# Patient Record
Sex: MALE | Race: WHITE | Employment: FULL TIME | URBAN - METROPOLITAN AREA
[De-identification: names, ages, dates, MRNs, and addresses within clinical notes are randomized per-mention and may not be internally consistent; named-entity substitution may affect disease eponyms.]

---

## 2022-04-25 ENCOUNTER — APPOINTMENT (OUTPATIENT)
Dept: CT IMAGING | Age: 62
End: 2022-04-25
Attending: PHYSICIAN ASSISTANT
Payer: COMMERCIAL

## 2022-04-25 ENCOUNTER — APPOINTMENT (OUTPATIENT)
Dept: GENERAL RADIOLOGY | Age: 62
End: 2022-04-25
Attending: PHYSICIAN ASSISTANT
Payer: COMMERCIAL

## 2022-04-25 ENCOUNTER — HOSPITAL ENCOUNTER (EMERGENCY)
Age: 62
Discharge: HOME | End: 2022-04-25
Attending: STUDENT IN AN ORGANIZED HEALTH CARE EDUCATION/TRAINING PROGRAM
Payer: COMMERCIAL

## 2022-04-25 VITALS
DIASTOLIC BLOOD PRESSURE: 95 MMHG | SYSTOLIC BLOOD PRESSURE: 176 MMHG | HEART RATE: 105 BPM | RESPIRATION RATE: 20 BRPM | OXYGEN SATURATION: 99 % | TEMPERATURE: 98 F

## 2022-04-25 DIAGNOSIS — G89.29 CHRONIC MIDLINE THORACIC BACK PAIN: Primary | ICD-10-CM

## 2022-04-25 DIAGNOSIS — M54.6 CHRONIC MIDLINE THORACIC BACK PAIN: Primary | ICD-10-CM

## 2022-04-25 DIAGNOSIS — M54.2 NECK PAIN, CHRONIC: ICD-10-CM

## 2022-04-25 DIAGNOSIS — G89.29 NECK PAIN, CHRONIC: ICD-10-CM

## 2022-04-25 DIAGNOSIS — M47.819 SPONDYLOARTHRITIS: ICD-10-CM

## 2022-04-25 PROCEDURE — 72125 CT NECK SPINE W/O DYE: CPT

## 2022-04-25 PROCEDURE — 99284 EMERGENCY DEPT VISIT MOD MDM: CPT

## 2022-04-25 PROCEDURE — 72070 X-RAY EXAM THORAC SPINE 2VWS: CPT

## 2022-04-25 PROCEDURE — 74011250636 HC RX REV CODE- 250/636: Performed by: PHYSICIAN ASSISTANT

## 2022-04-25 PROCEDURE — 96372 THER/PROPH/DIAG INJ SC/IM: CPT

## 2022-04-25 RX ORDER — KETOROLAC TROMETHAMINE 10 MG/1
10 TABLET, FILM COATED ORAL
Qty: 10 TABLET | Refills: 0 | Status: SHIPPED | OUTPATIENT
Start: 2022-04-25

## 2022-04-25 RX ORDER — KETOROLAC TROMETHAMINE 15 MG/ML
15 INJECTION, SOLUTION INTRAMUSCULAR; INTRAVENOUS
Status: COMPLETED | OUTPATIENT
Start: 2022-04-25 | End: 2022-04-25

## 2022-04-25 RX ORDER — LIDOCAINE 50 MG/G
PATCH TOPICAL
Qty: 1 EACH | Refills: 0 | Status: SHIPPED | OUTPATIENT
Start: 2022-04-25

## 2022-04-25 RX ADMIN — KETOROLAC TROMETHAMINE 15 MG: 15 INJECTION, SOLUTION INTRAMUSCULAR; INTRAVENOUS at 16:47

## 2022-04-25 NOTE — ED TRIAGE NOTES
Client has hx of chronic back pain, client has regular 2.5MG Dose of Valium. Client sent by PCP for evaluation and xrays. Client ambulatory into triage. NAD. AXOX4.  Pain 5/10

## 2022-04-25 NOTE — ED PROVIDER NOTES
[address] [facility],[address]  [facility] EMERGENCY DEPT    Patient Name: Shraddha Juárez. History of Presenting Illness     63 y.o. male with a past medical history of chronic upper back pain and neck pain presents the ED requesting Valium for his back and neck. Patient reports having had this pain for at least 10 years, states that he normally sees his primary care doctor for this. He states the last time that he was seen there patient became angry and had been back in several months. Patient notes having a constant severe discomfort, worse with range of motion, consistent with his prior back pain. He denies any fever, chills, numbness or weakness, bowel or bladder function loss, saddle paresthesias, abdominal pain, other symptoms. Patient denies any other associated signs or symptoms. Patient denies any other complaints. Nursing notes regarding the HPI and triage nursing notes were reviewed. Prior medical records were reviewed. Past History     Past Medical History:  Chronic back pain     Past Surgical History:  No past surgical history on file. Family History:  No family history on file. Social History:  Social History     Tobacco Use    Smoking status: Not on file    Smokeless tobacco: Not on file   Substance Use Topics    Alcohol use: Not on file    Drug use: Not on file       Allergies:  No Known Allergies    Patient's primary care provider (as noted in EPIC):  Chris Roe MD    Review of Systems   Constitutional:  Denies malaise, fever, chills. Head:  Denies injury. Face:  Denies injury or pain. Neck: + neck pain. GI/ABD:  Denies injury, pain, distention, nausea, vomiting, diarrhea. :  Denies injury, pain, dysuria or urgency. Back:  + upper back pain. Pelvis:  Denies injury or pain. Extremity/MS:  Denies injury or pain. Neuro:  Denies headache, LOC, dizziness, neurologic symptoms/deficits/paresthesias. Skin: Denies injury, rash, itching or skin changes.   All other systems negative as reviewed. Visit Vitals  BP (!) 176/95   Pulse (!) 105   Temp 98 °F (36.7 °C)   Resp 20   SpO2 99%       PHYSICAL EXAM:  CONSTITUTIONAL:  Alert, in no apparent distress;  well developed;  well nourished. HEAD:  Normocephalic, atraumatic. EYES:  EOMI. Non-icteric sclera. Normal conjunctiva. ENTM:  Nose:  no rhinorrhea. Throat:  no erythema or exudate, mucous membranes moist.  NECK:  Supple, bony TTP to C4-C7. RESPIRATORY:  Chest clear, equal breath sounds, good air movement. Without wheezes, rhonchi or rales. CARDIOVASCULAR:  Regular rate and rhythm. No murmurs, rubs, or gallops. GI:  Normal bowel sounds, abdomen soft and non-tender. No rebound or guarding. BACK:  Superior Tspine with bony TTP without step off. No other bony TTP. No midline vertebral bony point tenderness or step-off. UPPER EXT:  Normal inspection. LOWER EXT:  No edema, no calf tenderness. Distal pulses intact. NEURO:  Moves all four extremities, and grossly normal motor exam.  SKIN:  No rashes;  Normal for age. PSYCH:  Alert and normal affect. IMPRESSION AND MEDICAL DECISION MAKING:    XR SPINE THORAC 2 V    Result Date: 4/25/2022  THORACIC SPINE COMPLETE HISTORY: Chronic back pain, pain 5 out of 10. COMPARISON: None. FINDINGS: AP, lateral, and swimmers views of the thoracic spine were obtained. Minimal levoscoliosis T8-T9 which could be positional. Otherwise normal alignment. No acute fracture or dislocation. Vertebral heights maintained. . Minimal spondylitic spurring anteriorly mid to lower thoracic spine. No segmentation anomalies. No abnormal paraspinal density. .     Minimal spondylitic spurring anteriorly mid to lower thoracic spine    Called over to [facility] and CT, he states that the images were not sent, he will send now. 7:00 PM attempted to call again to CT, they did not answer. 7:13 PM : Pt care transferred to Blaze Gonzalez, [id number], ED provider.  History of patient complaint(s), available diagnostic reports and current treatment plan has been discussed thoroughly.    Pending diagnostics reports and/or labs (please list): CT CARMITA Doll

## 2022-04-26 NOTE — ED NOTES
1900: Assumed care of pt from [facility] at shift change. Plan: Awaiting CT scans. If normal, will d/c home with toradol and lidocaine patch. 1930: CT scan shows no acute findings. Will d/c home with toradol, lidocaine patch and f/u with Dr Iam Stewart. At time of discharge, pt non-toxic appearing in NAD. Pt stable for prompt outpatient follow-up with PCP 1 to 2 days. Patient given strict instructions to return if symptoms worsen.